# Patient Record
Sex: FEMALE | Race: WHITE
[De-identification: names, ages, dates, MRNs, and addresses within clinical notes are randomized per-mention and may not be internally consistent; named-entity substitution may affect disease eponyms.]

---

## 2019-09-20 ENCOUNTER — HOSPITAL ENCOUNTER (EMERGENCY)
Dept: HOSPITAL 4 - SED | Age: 54
Discharge: HOME | End: 2019-09-20
Payer: MEDICAID

## 2019-09-20 VITALS — SYSTOLIC BLOOD PRESSURE: 160 MMHG

## 2019-09-20 VITALS — WEIGHT: 206 LBS | BODY MASS INDEX: 38.89 KG/M2 | HEIGHT: 61 IN

## 2019-09-20 VITALS — SYSTOLIC BLOOD PRESSURE: 150 MMHG
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
## 2019-09-20 DIAGNOSIS — T24.221A: Primary | ICD-10-CM
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
## 2019-09-20 NOTE — NUR
Patient given written and verbal discharge instructions and verbalizes 
understanding.  ER MD discussed with patient the results and treatment 
provided. Patient in stable condition. ID arm band removed.

Rx Keflex, Bacitracin, Motrin given. Patient educated on pain management and to 
follow up with PMD. Pain Scale 7/10, prescription given for pain management. 
Patient and family verbalized understanding.

Opportunity for questions provided and answered. Medication side effect fact 
sheet provided.

## 2019-09-20 NOTE — NUR
Patient arrived via POV, AAOx4, and ambulatory with steady gait. Patient non 
english speaking, family at bedside for exam. Patient c/c of right knee pain. 
Per patient, she fell at work on 9/8/19, 12 days ago. She was seen by RN, and 
sent to urgent care. Instructed to place heat and ice on knee for relief. 
Patient states 1 week ago she used the heat pack and sustained burning, 
redness, and blister. She returned to urgent care and was given an aloe vera 
cream with no relief. Current pain is 7/10, isolated to right knee. She has 
taken tylenol with no relief, last dose was 7 days ago. Will continue to follow 
up and monitor.